# Patient Record
Sex: MALE | Race: OTHER | HISPANIC OR LATINO | URBAN - METROPOLITAN AREA
[De-identification: names, ages, dates, MRNs, and addresses within clinical notes are randomized per-mention and may not be internally consistent; named-entity substitution may affect disease eponyms.]

---

## 2024-03-06 ENCOUNTER — APPOINTMENT (OUTPATIENT)
Dept: RADIOLOGY | Facility: MEDICAL CENTER | Age: 29
End: 2024-03-06
Attending: STUDENT IN AN ORGANIZED HEALTH CARE EDUCATION/TRAINING PROGRAM

## 2024-03-06 ENCOUNTER — HOSPITAL ENCOUNTER (EMERGENCY)
Facility: MEDICAL CENTER | Age: 29
End: 2024-03-06
Attending: STUDENT IN AN ORGANIZED HEALTH CARE EDUCATION/TRAINING PROGRAM

## 2024-03-06 VITALS
RESPIRATION RATE: 16 BRPM | DIASTOLIC BLOOD PRESSURE: 79 MMHG | SYSTOLIC BLOOD PRESSURE: 129 MMHG | OXYGEN SATURATION: 98 % | BODY MASS INDEX: 24.92 KG/M2 | WEIGHT: 178 LBS | TEMPERATURE: 97.5 F | HEIGHT: 71 IN | HEART RATE: 72 BPM

## 2024-03-06 DIAGNOSIS — S93.402A SPRAIN OF LEFT ANKLE, UNSPECIFIED LIGAMENT, INITIAL ENCOUNTER: ICD-10-CM

## 2024-03-06 DIAGNOSIS — M25.473 SOFT TISSUE SWELLING OF ANKLE JOINT: ICD-10-CM

## 2024-03-06 PROCEDURE — 73600 X-RAY EXAM OF ANKLE: CPT | Mod: LT

## 2024-03-06 PROCEDURE — A9270 NON-COVERED ITEM OR SERVICE: HCPCS | Performed by: STUDENT IN AN ORGANIZED HEALTH CARE EDUCATION/TRAINING PROGRAM

## 2024-03-06 PROCEDURE — 700102 HCHG RX REV CODE 250 W/ 637 OVERRIDE(OP): Performed by: STUDENT IN AN ORGANIZED HEALTH CARE EDUCATION/TRAINING PROGRAM

## 2024-03-06 PROCEDURE — 99284 EMERGENCY DEPT VISIT MOD MDM: CPT

## 2024-03-06 RX ORDER — IBUPROFEN 400 MG/1
400 TABLET ORAL EVERY 6 HOURS PRN
Qty: 30 TABLET | Refills: 0 | Status: SHIPPED | OUTPATIENT
Start: 2024-03-06 | End: 2024-03-16

## 2024-03-06 RX ORDER — HYDROCODONE BITARTRATE AND ACETAMINOPHEN 5; 325 MG/1; MG/1
1 TABLET ORAL ONCE
Status: COMPLETED | OUTPATIENT
Start: 2024-03-06 | End: 2024-03-06

## 2024-03-06 RX ADMIN — HYDROCODONE BITARTRATE AND ACETAMINOPHEN 1 TABLET: 5; 325 TABLET ORAL at 18:08

## 2024-03-06 ASSESSMENT — PAIN DESCRIPTION - PAIN TYPE: TYPE: ACUTE PAIN

## 2024-03-07 NOTE — ED NOTES
Ace wrap applied. Pt cleared for discharge, pt declining crutches. VSS, pt in nad.   Pt discharged home with written and verbal instructions regarding f/u, activity, reasons to return to ED & RX to  at preferred pharmacy. Verbalized understanding, all questions addressed, pt WC to lobby with all belongings with mom as ride home

## 2024-03-07 NOTE — DISCHARGE INSTRUCTIONS
Please wrap your ankle as needed for comfort.  Please take Tylenol and ibuprofen.  Please elevate your ankle to reduce swelling.  Please use ice.

## 2024-03-07 NOTE — ED PROVIDER NOTES
"ED Provider Note    CHIEF COMPLAINT  Chief Complaint   Patient presents with    Ankle Injury     L ankle swelling and pain s/p 'a large branch fell off the tree I was cutting and hit me in the ankle', +CMS, significant lateral ankle swelling        EXTERNAL RECORDS REVIEWED  Other none    HPI/ROS  LIMITATION TO HISTORY   Select: : None  OUTSIDE HISTORIAN(S):  none    Link Tolbert is a 29 y.o. male who presents with left ankle pain and swelling.  Patient notes a large tree branch fell off of a tree and landed on his ankle.  He noted significant swelling to the outside of his ankle.  He is not able to walk on it due to pain.  He does not take any medications for the pain.  He notes no numbness or tingling, no weakness, no bleeding.  Has no history of injuries to the ankle in the past.    PAST MEDICAL HISTORY       SURGICAL HISTORY  patient denies any surgical history    FAMILY HISTORY  No family history on file.    SOCIAL HISTORY  Social History     Tobacco Use    Smoking status: Not on file    Smokeless tobacco: Not on file   Substance and Sexual Activity    Alcohol use: Not on file    Drug use: Not on file    Sexual activity: Not on file       CURRENT MEDICATIONS  Home Medications       Reviewed by Amandeep Dyson R.N. (Registered Nurse) on 03/06/24 at 1659  Med List Status: Not Addressed     Medication Last Dose Status        Patient Selwyn Taking any Medications                           ALLERGIES  No Known Allergies    PHYSICAL EXAM  VITAL SIGNS: /79   Pulse 72   Temp 36.4 °C (97.5 °F) (Temporal)   Resp 16   Ht 1.803 m (5' 11\")   Wt 80.7 kg (178 lb)   SpO2 98%   BMI 24.83 kg/m²    Constitutional: Awake and alert. No acute distress.  Head: NCAT.  HEENT: Normal Conjunctiva. PERRLA.  Neck: Grossly normal range of motion. Airway midline.  Cardiovascular: Normal heart rate, Normal rhythm.  Thorax & Lungs: No respiratory distress. Clear to Auscultation bilaterally.  Abdomen: Normal inspection. " Nontender. Nondistended  Skin: No obvious rash.  Back: No tenderness, No CVA tenderness.   Musculoskeletal: Notable swelling to the lateral malleolus without obvious deformity.  No tenderness or swelling to the foot, lower leg or knee above or below the swelling.  Neurologic: A&Ox3.  Station grossly intact, dorsi and plantarflexion is adequate able to move all toes.  Psychiatric: Mood and affect are appropriate for situation.    RADIOLOGY  I have independently interpreted the diagnostic imaging associated with this visit and am waiting the final reading from the radiologist.   My preliminary interpretation is as follows: no fracture  Radiologist interpretation:   DX-ANKLE 2- VIEWS LEFT   Final Result      1.  Focal soft tissue swelling about the lateral malleolus.      2.  No evidence of acute ankle fracture.            COURSE & MEDICAL DECISION MAKING    ED Observation Status? No; Patient does not meet criteria for ED Observation.     INITIAL ASSESSMENT, COURSE AND PLAN  Care Narrative:   29-year-old male here with swelling and pain to the ankle after a tree branch landed on his foot and ankle.  Afebrile and reassuring vitals  On exam notable swelling to the lateral malleolus.  Has reasonable range of motion with dorsi and plantarflexion, able to move all toes.  Pulses intact and no obvious deformity.  Differential includes fracture, sprain, strain, soft tissue swelling, contusion  Will treat his pain and evaluate with x-ray for the above.  No fracture found, soft tissue swelling noted on x-ray.  He was Ace wrapped for alleviation of swelling and assistance with pain.  He was offered crutches and declined.  Discussed Tylenol and Motrin at home, RICE therapy.  Return to activity as tolerated.      ADDITIONAL PROBLEM LIST  None  DISPOSITION AND DISCUSSIONS  I have discussed management of the patient with the following physicians and ANANYA's:  None    Discussion of management with other QHP or appropriate source(s):  None     Escalation of care considered, and ultimately not performed:diagnostic imaging and acute inpatient care management, however at this time, the patient is most appropriate for outpatient management    Barriers to care at this time, including but not limited to: Patient does not have established PCP and Patient lacks financial resources.     Decision tools and prescription drugs considered including, but not limited to: Pain Medications : Motrin as needed for pain. .    FINAL DIAGNOSIS  1. Sprain of left ankle, unspecified ligament, initial encounter    2. Soft tissue swelling of ankle joint           Electronically signed by: Seth Morgan D.O., 3/6/2024 5:52 PM

## 2024-03-07 NOTE — ED TRIAGE NOTES
"Chief Complaint   Patient presents with    Ankle Injury     L ankle swelling and pain s/p 'a large branch fell off the tree I was cutting and hit me in the ankle', +CMS, significant lateral ankle swelling      Pt to triage in  for above complaints, VSS on RA, GCS 15, NAD.    Pt returned to lobby. Educated on triage process and to inform staff of any changes.     /75   Pulse 77   Temp 36.2 °C (97.2 °F) (Temporal)   Resp 18   Ht 1.803 m (5' 11\")   Wt 80.7 kg (178 lb)   SpO2 97%   BMI 24.83 kg/m²     "

## 2024-03-16 ENCOUNTER — APPOINTMENT (OUTPATIENT)
Dept: RADIOLOGY | Facility: MEDICAL CENTER | Age: 29
End: 2024-03-16
Attending: STUDENT IN AN ORGANIZED HEALTH CARE EDUCATION/TRAINING PROGRAM

## 2024-03-16 ENCOUNTER — HOSPITAL ENCOUNTER (EMERGENCY)
Facility: MEDICAL CENTER | Age: 29
End: 2024-03-16
Attending: STUDENT IN AN ORGANIZED HEALTH CARE EDUCATION/TRAINING PROGRAM

## 2024-03-16 VITALS
OXYGEN SATURATION: 97 % | HEIGHT: 70 IN | TEMPERATURE: 97.9 F | RESPIRATION RATE: 16 BRPM | DIASTOLIC BLOOD PRESSURE: 90 MMHG | WEIGHT: 175 LBS | BODY MASS INDEX: 25.05 KG/M2 | SYSTOLIC BLOOD PRESSURE: 155 MMHG | HEART RATE: 64 BPM

## 2024-03-16 DIAGNOSIS — S92.215A CLOSED NONDISPLACED FRACTURE OF CUBOID OF LEFT FOOT, INITIAL ENCOUNTER: ICD-10-CM

## 2024-03-16 DIAGNOSIS — S92.002A CLOSED NONDISPLACED FRACTURE OF LEFT CALCANEUS, UNSPECIFIED PORTION OF CALCANEUS, INITIAL ENCOUNTER: ICD-10-CM

## 2024-03-16 PROCEDURE — 73610 X-RAY EXAM OF ANKLE: CPT | Mod: LT

## 2024-03-16 PROCEDURE — 99284 EMERGENCY DEPT VISIT MOD MDM: CPT

## 2024-03-16 PROCEDURE — 302875 HCHG BANDAGE ACE 4 OR 6""

## 2024-03-16 PROCEDURE — 73700 CT LOWER EXTREMITY W/O DYE: CPT | Mod: LT

## 2024-03-16 PROCEDURE — 29515 APPLICATION SHORT LEG SPLINT: CPT

## 2024-03-16 PROCEDURE — 96372 THER/PROPH/DIAG INJ SC/IM: CPT

## 2024-03-16 PROCEDURE — 700102 HCHG RX REV CODE 250 W/ 637 OVERRIDE(OP): Performed by: STUDENT IN AN ORGANIZED HEALTH CARE EDUCATION/TRAINING PROGRAM

## 2024-03-16 PROCEDURE — 700111 HCHG RX REV CODE 636 W/ 250 OVERRIDE (IP): Performed by: STUDENT IN AN ORGANIZED HEALTH CARE EDUCATION/TRAINING PROGRAM

## 2024-03-16 PROCEDURE — A9270 NON-COVERED ITEM OR SERVICE: HCPCS | Performed by: STUDENT IN AN ORGANIZED HEALTH CARE EDUCATION/TRAINING PROGRAM

## 2024-03-16 RX ORDER — KETOROLAC TROMETHAMINE 15 MG/ML
15 INJECTION, SOLUTION INTRAMUSCULAR; INTRAVENOUS ONCE
Status: COMPLETED | OUTPATIENT
Start: 2024-03-16 | End: 2024-03-16

## 2024-03-16 RX ORDER — ACETAMINOPHEN 325 MG/1
1000 TABLET ORAL EVERY 6 HOURS PRN
Qty: 30 TABLET | Refills: 0 | Status: SHIPPED | OUTPATIENT
Start: 2024-03-16

## 2024-03-16 RX ORDER — IBUPROFEN 400 MG/1
400 TABLET ORAL EVERY 6 HOURS PRN
Qty: 30 TABLET | Refills: 0 | Status: SHIPPED | OUTPATIENT
Start: 2024-03-16

## 2024-03-16 RX ORDER — ACETAMINOPHEN 325 MG/1
650 TABLET ORAL ONCE
Status: COMPLETED | OUTPATIENT
Start: 2024-03-16 | End: 2024-03-16

## 2024-03-16 RX ADMIN — KETOROLAC TROMETHAMINE 15 MG: 15 INJECTION, SOLUTION INTRAMUSCULAR; INTRAVENOUS at 17:41

## 2024-03-16 RX ADMIN — ACETAMINOPHEN 650 MG: 325 TABLET, FILM COATED ORAL at 17:41

## 2024-03-16 ASSESSMENT — PAIN DESCRIPTION - PAIN TYPE
TYPE: CHRONIC PAIN;ACUTE PAIN
TYPE: ACUTE PAIN

## 2024-03-16 NOTE — ED TRIAGE NOTES
.  Chief Complaint   Patient presents with    Ankle Injury     Left ankle pain, injury on 3/6     To triage with s/o by w/c. Walking boot to left foot. Pt reports seen here on 3/16 continues to have pain 9/10 to left ankle. Guamanian interpretor [id number] assisted with translating.

## 2024-03-17 NOTE — DISCHARGE INSTRUCTIONS
Lo atendieron en el departamento de emergencias por dolor en el tobillo isis.  Le diagnostican yue fractura de calcáneo y de cuboides, ambos huesos de los pies.  Le colocan yue férula en el departamento de emergencias. Lo remitimos a cirugía ortopédica. Llame al número que aparece a continuación el lunes por la mañana a las 8 a.m. para programar yue shaan.  Rhodell Tylenol e ibuprofeno según lo recetado para aliviar el dolor. Mantenga el tobillo elevado. No soporte peso sobre vicente tobillo isis. Utilice las muletas proporcionadas.      [facility]  [address] [facility] [address]  [phone]

## 2024-03-17 NOTE — ED NOTES
Discharge education provided. Patient verbalizes understanding. Patient A/0x4. Patient discharged home to self care.

## 2024-03-17 NOTE — ED NOTES
Pt reports he has crutches at home; refused new set. Pt educated on crutches use by EDT, pt verbalized understanding of education provided.

## 2024-03-17 NOTE — ED NOTES
Splint applied per ERPs order, patient tolerated well. Crutches and crutch training provided, patient expressed understanding.

## 2024-03-17 NOTE — ED PROVIDER NOTES
"ED Provider Note    CHIEF COMPLAINT  Chief Complaint   Patient presents with    Ankle Injury     Left ankle pain, injury on 3/6       EXTERNAL RECORDS REVIEWED  Internal emergency department note 3/6/2024, evaluation for left ankle pain, diagnosed with a sprain, sent home in a walking boot    HPI/ROS  LIMITATION TO HISTORY   Select: Language [place],  Used   OUTSIDE HISTORIAN(S):  Wife providing clinically relevant collateral history    Link Tolbert is a 29 y.o. male presenting to the emergency department for persistent left ankle pain.  Says that he twisted his ankle on 3/6/2024, came back to the emergency department due to persistent pain, worsening swelling on the left ankle over the last several days.  No numbness weakness tingling.  Has been wearing a walking boot since he was seen in the emergency department on 3/6/2024.  Ultimately at that time, x-ray showed no evidence of an acute fracture.    Patient denies any other injuries.    PAST MEDICAL HISTORY       SURGICAL HISTORY  patient denies any surgical history    FAMILY HISTORY  History reviewed. No pertinent family history.    SOCIAL HISTORY  Social History     Tobacco Use    Smoking status: Never    Smokeless tobacco: Never   Vaping Use    Vaping Use: Never used   Substance and Sexual Activity    Alcohol use: Never    Drug use: Never    Sexual activity: Not on file       CURRENT MEDICATIONS  Home Medications       Reviewed by Miguelina Monreal R.N. (Registered Nurse) on 03/16/24 at 1603  Med List Status: Complete     Medication Last Dose Status   ibuprofen (MOTRIN) 400 MG Tab  Active                    ALLERGIES  No Known Allergies    PHYSICAL EXAM  VITAL SIGNS: BP (!) 155/90   Pulse 64   Temp 36.6 °C (97.9 °F) (Temporal)   Resp 16   Ht 1.778 m (5' 10\")   Wt 79.4 kg (175 lb)   SpO2 97%   BMI 25.11 kg/m²    General: Well- appearing , non-toxic, no acute distress  Neuro: oriented x 3, moving all extremities.   HEENT:   - Head: " Normocephalic, atraumatic  - Eyes: PERRL  - Ears/Nose: normal external nose and ears  - Mouth: moist mucosal membranes  Resp: clear to auscultation, no increased work of breathing  CV: Regular rate and rhythm  Abd: Soft, non-tender, non-distended  Extremities:   Left lower extremity: Swelling and tenderness to the ankle, swelling over the ATFL, lateral ligamentous complex, no tenderness to palpation of the calcaneus, Achilles.  There is ecchymosis to the distal lower leg.  No tenderness over the proximal fibula.  He has a warm and well-perfused foot.  2+ DP pulse.  Dorsiflexion, plantarflexion of the foot remains intact.  Sensation intact to light touch.  Psych: lucid and conversational         DIAGNOSTIC STUDIES / PROCEDURES    EKG  My independent EKG interpretation:  No results found for this or any previous visit.    LABS  No results found for this or any previous visit.    RADIOLOGY  I have independently interpreted the diagnostic imaging associated with this visit and am waiting the final reading from the radiologist.   My preliminary interpretation is as follows:   - Film of the ankle shows an avulsion fracture off of the talus.  CT of the ankle reviewed shows a cuboid, calcaneus, talar avulsion fracture  Radiologist interpretation:   CT-ANKLE W/O PLUS RECONS LEFT   Final Result         1. Acute comminuted mildly impacted fracture of the cuboid adjacent to the tarsometatarsal joints.   2. Small osseous fragment adjacent to the medial malleolus could relate to age-indeterminate avulsion fracture, likely subacute.   3. Additional nondisplaced fracture of the anterior calcaneal process could be subacute as well.      DX-ANKLE 3+ VIEWS LEFT   Final Result      Small somewhat well corticated osseous fragment adjacent to the medial malleolus is age indeterminant but could be chronic.      Soft tissue swelling about the lateral malleolus.      No definite acute fracture seen.              MEDICAL DECISION  MAKING    ED Observation Status? No; Patient does not meet criteria for ED Observation.     ED COURSE AND PLAN    Link Tolbert is a 29 y.o. male presenting to the emergency department 10 days after a left ankle injury.  Initial x-ray showed no evidence of fracture.  X-ray today shows a small avulsion fracture suspected from the talus at the connection of the lateral ligamentous complex.  Foot is otherwise warm and well-perfused.  There is no open skin laceration.  There is diffuse swelling to the lower leg and ankle.  Patient was treated with Tylenol, ibuprofen.  Will plan to obtain a CT scan to ensure no evidence of a talus or tarsal injury.    ---Pertinent ED Course---:    6:05 PM I reviewed the patient's old records in Epic, medication list, allergies, past medical history and performed a physical examination.     7:00 PM CT ankle reviewed shows talus avulsion fracture, anterior calcaneal process fracture, cuboid fracture.  Patient will be placed in a short leg splint, made nonweightbearing and referred for follow-up with orthopedic surgery.  Advised on strict return precautions.  Appropriate for discharge at this time.        Procedures:      ----------------------------------------------------------------------------------  DISCUSSIONS    I have discussed management of the patient with the following physicians and ANANYA's:      Discussion of management with other QHP or appropriate source(s):     Escalation of care considered, and ultimately not performed: Considered but no indication for consultation with orthopedic surgery    Barriers to care at this time, including but not limited to: Limited financial capacity    Decision tools and prescription drugs considered including, but not limited to:     FINAL IMPRESSION    1. Closed nondisplaced fracture of left calcaneus, unspecified portion of calcaneus, initial encounter    2. Closed nondisplaced fracture of cuboid of left foot, initial encounter         Discharge Medication List as of 3/16/2024  7:30 PM        START taking these medications    Details   acetaminophen (TYLENOL) 325 MG Tab Take 3 Tablets by mouth every 6 hours as needed for Mild Pain., Disp-30 Tablet, R-0, Normal               DISPOSITION    Discharge home, Stable    This chart was dictated using an electronic voice recognition software. The chart has been reviewed and edited but there is still possibility for dictation errors due to limitation of software.    Waqar Gar, DO 3/16/2024